# Patient Record
Sex: FEMALE | Race: NATIVE HAWAIIAN OR OTHER PACIFIC ISLANDER | HISPANIC OR LATINO
[De-identification: names, ages, dates, MRNs, and addresses within clinical notes are randomized per-mention and may not be internally consistent; named-entity substitution may affect disease eponyms.]

---

## 2017-04-10 ENCOUNTER — HOSPITAL (OUTPATIENT)
Dept: OTHER | Age: 22
End: 2017-04-10
Attending: OBSTETRICS & GYNECOLOGY

## 2017-04-24 ENCOUNTER — HOSPITAL (OUTPATIENT)
Dept: OTHER | Age: 22
End: 2017-04-24
Attending: OBSTETRICS & GYNECOLOGY

## 2021-05-11 ENCOUNTER — APPOINTMENT (OUTPATIENT)
Dept: URGENT CARE | Age: 26
End: 2021-05-11

## 2022-07-14 ENCOUNTER — HOSPITAL ENCOUNTER (EMERGENCY)
Facility: CLINIC | Age: 27
Discharge: HOME | End: 2022-07-14
Attending: EMERGENCY MEDICINE | Admitting: EMERGENCY MEDICINE
Payer: MEDICAID

## 2022-07-14 VITALS
HEART RATE: 83 BPM | SYSTOLIC BLOOD PRESSURE: 115 MMHG | TEMPERATURE: 98.7 F | OXYGEN SATURATION: 96 % | DIASTOLIC BLOOD PRESSURE: 69 MMHG | RESPIRATION RATE: 18 BRPM | HEIGHT: 62 IN | WEIGHT: 220 LBS | BODY MASS INDEX: 40.48 KG/M2

## 2022-07-14 DIAGNOSIS — S91.312A FOOT LACERATION, LEFT, INITIAL ENCOUNTER: ICD-10-CM

## 2022-07-14 PROCEDURE — 99283 EMERGENCY DEPT VISIT LOW MDM: CPT

## 2022-07-14 PROCEDURE — 250N000009 HC RX 250: Performed by: EMERGENCY MEDICINE

## 2022-07-14 PROCEDURE — 250N000013 HC RX MED GY IP 250 OP 250 PS 637: Performed by: EMERGENCY MEDICINE

## 2022-07-14 RX ORDER — GINSENG 100 MG
CAPSULE ORAL ONCE
Status: COMPLETED | OUTPATIENT
Start: 2022-07-14 | End: 2022-07-14

## 2022-07-14 RX ORDER — CEPHALEXIN 500 MG/1
500 CAPSULE ORAL ONCE
Status: COMPLETED | OUTPATIENT
Start: 2022-07-14 | End: 2022-07-14

## 2022-07-14 RX ORDER — CEPHALEXIN 500 MG/1
500 CAPSULE ORAL 4 TIMES DAILY
Qty: 40 CAPSULE | Refills: 0 | Status: SHIPPED | OUTPATIENT
Start: 2022-07-14 | End: 2022-07-24

## 2022-07-14 RX ADMIN — BACITRACIN: 500 OINTMENT TOPICAL at 21:54

## 2022-07-14 RX ADMIN — CEPHALEXIN 500 MG: 500 CAPSULE ORAL at 21:54

## 2022-07-14 ASSESSMENT — ENCOUNTER SYMPTOMS: WOUND: 1

## 2022-07-15 NOTE — DISCHARGE INSTRUCTIONS
Running water over the laceration is ok but do not soak the wound until the sutures are removed; soaking could prematurely loosen the sutures.    Take the antibiotic pill (Keflex) to help prevent any infection.    Use over-the-counter ointment (like Bacitracin or Neosporin) to the laceration to help prevent infection. Vaseline is also just as good at preventing infection and often much cheaper.    Get the sutures removed in 8-10 days in primary care clinic.    Return for any pus draining, streaking skin redness, or any other concerns.

## 2022-07-15 NOTE — ED TRIAGE NOTES
Patient reports cutting the top of left foot while walking on the water. Last TDAP 5/26/17     Triage Assessment     Row Name 07/14/22 2015       Triage Assessment (Adult)    Airway WDL WDL       Respiratory WDL    Respiratory WDL WDL       Skin Circulation/Temperature WDL    Skin Circulation/Temperature WDL WDL       Cardiac WDL    Cardiac WDL WDL       Peripheral/Neurovascular WDL    Peripheral Neurovascular WDL WDL       Cognitive/Neuro/Behavioral WDL    Cognitive/Neuro/Behavioral WDL WDL

## 2022-07-15 NOTE — ED PROVIDER NOTES
EMERGENCY DEPARTMENT ENCOUNTER      NAME: Aviva Thompson  AGE: 26 year old female  YOB: 1995  MRN: [id number]  EVALUATION DATE & TIME: 7/14/2022  8:39 PM    PCP: No primary care provider on file.    ED PROVIDER: Josue Michel M.D.      Chief Complaint   Patient presents with     Laceration     Left foot          IMPRESSION  1. Foot laceration, left, initial encounter        PLAN  - routine non-absorbable sutured wound cares  - suture removal in 8-10 days in PCP clinic (4 total sutures)  - prophylactic Keflex  - discharge to home    ED COURSE & MEDICAL DECISION MAKING    ED Course as of 07/14/22 2210   Thu Jul 14, 2022   2201 26yoF with tetanus up to date presenting with laceration to dorsum of left foot, lateral aspect. Occurred today while walking in a river; no fall. Has been walking. Exam with 3cm laceration, 4cm lateral to intact DP pulse---CMS intact. No bony tenderness or pain with ROM of joints. Laceration superficial. No concern for neurovascular injury, tendon injury, bony fracture, foreign body. Cleansed and closed without difficulty with 4 sutures; given prophylactic Keflex given water exposure. Dressed with bacitracin & clean dressing. Patient able to tolerate PO and walk without difficulty. Patient comfortable with discharge at this time. Return precautions and need for PCP follow up discussed and understood. No further questions at the time of discharge.       --------------------------------------------------------------------------------   --------------------------------------------------------------------------------     9:23 PM I met with the patient for the initial interview and physical examination. Discussed plan for treatment and workup in the ED.  9:57 PM I performed a laceration on patient. We discussed the plan for discharge and the patient is agreeable. Reviewed supportive cares, symptomatic treatment, outpatient follow up, and reasons to return to the Emergency  "Department. Patient to be discharged by ED RN.       This patient involved a high degree of complexity in medical decision making, as significant risks were present and assessed.    Broad differential considered for this patient presenting, including but not limited to:  Laceration, neurovascular injury, tendon injury, bony injury, foreign body, freshwater exposure    I wore the following PPE during this patient encounter:  N95 mask, face shield w/ eye protection, gloves    MEDICATIONS GIVEN IN THE EMERGENCY DEPARTMENT  Medications   cephALEXin (KEFLEX) capsule 500 mg (500 mg Oral Given 7/14/22 2154)   bacitracin ointment ( Topical Given 7/14/22 2154)       NEW PRESCRIPTIONS STARTED AT TODAY'S ER VISIT  Current Discharge Medication List      START taking these medications    Details   cephALEXin (KEFLEX) 500 MG capsule Take 1 capsule (500 mg) by mouth 4 times daily for 10 days  Qty: 40 capsule, Refills: 0                 =================================================================      HPI  Patient information was obtained from: Patient    Use of : N/A       Aviva Thompson is a 26 year old female with no pertinent history who presents to this ED via walk-in for evaluation of laceration.    Patient reports left foot laceration that happened today around 1800 at [facility]. She fell in the river and a a rock \"slice\" her left foot. Her fiancee was able to assisted her walking. She states the pain hurt more when she put pressure on her left foot. No other complaints at this time.       REVIEW OF SYSTEMS   Review of Systems   Skin: Positive for wound.        Positive for left foot laceration     All other systems reviewed and are negative.    All other systems reviewed and are negative except as noted above in HPI.          --------------- MEDICAL HISTORY ---------------  PAST MEDICAL HISTORY:  History reviewed. No pertinent past medical history.    PAST SURGICAL HISTORY:  History reviewed. No " "pertinent surgical history.    CURRENT MEDICATIONS:    No current facility-administered medications for this encounter.    Current Outpatient Medications:      cephALEXin (KEFLEX) 500 MG capsule, Take 1 capsule (500 mg) by mouth 4 times daily for 10 days, Disp: 40 capsule, Rfl: 0    ALLERGIES:  Allergies   Allergen Reactions     Codeine Anaphylaxis       FAMILY HISTORY:  History reviewed. No pertinent family history.      SOCIAL HISTORY:   Lives in [place], engaged, has a 5yo son     --------------- PHYSICAL EXAM ---------------  Nursing notes and vitals reviewed by me.  VITALS:  Vitals:    07/14/22 2017   BP: (!) 133/92   Pulse: 82   Resp: 16   Temp: 98.7  F (37.1  C)   TempSrc: Oral   SpO2: 97%   Weight: 99.8 kg (220 lb)   Height: 1.575 m (5' 2\")       PHYSICAL EXAM:    General:  alert, interactive, no distress  Eyes:  conjunctivae clear, conjugate gaze  HENT:  atraumatic, nose with no rhinorrhea, oropharynx clear  Neck:  no meningismus  Cardiovascular:  HR 70s during exam, regular rhythm, no murmurs, brisk cap refill  Chest:  no chest wall tenderness  Pulmonary:  no stridor, normal phonation, normal work of breathing, clear lungs bilaterally  Abdomen:  soft, nondistended, nontender  :  no CVA tenderness  Back:  no midline spinal tenderness  Musculoskeletal:  3 cm curvllinear laceration to laternal aspects of mid dorsal foot. No bleeding. 2 +DP, PT. CM's intact. No bony tenderness. Pain ROM joint.  Skin:  warm, dry, no rash  Neuro:  awake, alert, answers questions appropriately, follows commands, moves all limbs  Psych:  calm, normal affect      --------------- RESULTS ---------------  PROCEDURES:   [facility]    -Laceration Repair    Date/Time: 7/14/2022 9:52 PM  Performed by: Josue Michel MD  Authorized by: Josue Michel MD     Risks, benefits and alternatives discussed.      ANESTHESIA (see MAR for exact dosages):     Anesthesia method:  Local infiltration    Local anesthetic:  " Lidocaine 1% WITH epi  LACERATION DETAILS     Location:  Foot    Foot location:  Top of L foot    Length (cm):  4    Depth (mm):  2    REPAIR TYPE:     Repair type:  Simple      EXPLORATION:     Wound exploration: wound explored through full range of motion and entire depth of wound probed and visualized      Wound extent: areolar tissue violated      Wound extent: fascia not violated, no foreign body, no signs of injury, no nerve damage, no tendon damage, no underlying fracture and no vascular damage      Contaminated: no      TREATMENT:     Area cleansed with:  Chata-Clens    Amount of cleaning:  Extensive    Irrigation solution:  Sterile water    Irrigation method:  Syringe    Visualized foreign bodies/material removed: no      SKIN REPAIR     Repair method:  Sutures    Suture size:  4-0    Suture material:  Nylon    Suture technique:  Simple interrupted    Number of sutures:  4    APPROXIMATION     Approximation:  Close    POST-PROCEDURE DETAILS     Dressing:  Antibiotic ointment and non-adherent dressing        PROCEDURE    Patient Tolerance:  Patient tolerated the procedure well with no immediate complications           IKiarra, am serving as a scribe to document services personally performed by Dr. Josue Michel based on my observation and the provider's statements to me. I, Josue Michel MD attest that Kiarra Oliveira is acting in a scribe capacity, has observed my performance of the services and has documented them in accordance with my direction.      Josue Michel MD  07/14/22  Emergency Medicine  [facility] EMERGENCY ROOM  [address] [facility] [id number] [phone]  Dept: [phone]     Josue Michel MD  07/14/22 7565

## 2023-02-07 ENCOUNTER — NURSE TRIAGE (OUTPATIENT)
Dept: NURSING | Facility: CLINIC | Age: 28
End: 2023-02-07
Payer: COMMERCIAL

## 2023-02-07 NOTE — TELEPHONE ENCOUNTER
"Patient calling reporting fatigue, and sore throat.    Reporting shortness of breath starting over 1 week ago, ongoing.    Sore throat pain \"6\" on 0-10 pain scale.    Painful to open mouth, patient is able to open mouth completely during triage.    Temp ranging to 101 Oral today.    Reporting mild shortness of breath. Taking fluids.    Disposition per triage go to office now. Reviewed Walk In Care locations.    Patient stating she is currently working and would go later this afternoon. Reviewed recommendation per guidelines to be seen now.    Caller verbalized understanding. Denies further questions.    Bhargavi Núñez RN  [facility]      Reason for Disposition    Difficulty breathing (per caller) but not severe    Additional Information    Negative: SEVERE difficulty breathing (e.g., struggling for each breath, speaks in single words)    Negative: Sounds like a life-threatening emergency to the triager    Negative: Throat culture results, call about    Negative: Productive cough is main symptom    Negative: Runny nose is main symptom    Negative: Drooling or spitting out saliva (because can't swallow)    Negative: Unable to open mouth completely    Negative: Drinking very little and has signs of dehydration (e.g., no urine > 12 hours, very dry mouth, very lightheaded)    Negative: Patient sounds very sick or weak to the triager    Protocols used: SORE THROAT-A-OH      "

## 2023-02-08 ENCOUNTER — NURSE TRIAGE (OUTPATIENT)
Dept: NURSING | Facility: CLINIC | Age: 28
End: 2023-02-08
Payer: COMMERCIAL

## 2023-02-08 ENCOUNTER — OFFICE VISIT (OUTPATIENT)
Dept: FAMILY MEDICINE | Facility: CLINIC | Age: 28
End: 2023-02-08
Payer: COMMERCIAL

## 2023-02-08 VITALS
OXYGEN SATURATION: 95 % | BODY MASS INDEX: 47.06 KG/M2 | SYSTOLIC BLOOD PRESSURE: 112 MMHG | RESPIRATION RATE: 16 BRPM | DIASTOLIC BLOOD PRESSURE: 77 MMHG | TEMPERATURE: 98 F | WEIGHT: 257.3 LBS | HEART RATE: 107 BPM

## 2023-02-08 DIAGNOSIS — Z20.818 STREPTOCOCCUS EXPOSURE: Primary | ICD-10-CM

## 2023-02-08 LAB
DEPRECATED S PYO AG THROAT QL EIA: NEGATIVE
GROUP A STREP BY PCR: NOT DETECTED

## 2023-02-08 PROCEDURE — 99203 OFFICE O/P NEW LOW 30 MIN: CPT | Performed by: PHYSICIAN ASSISTANT

## 2023-02-08 PROCEDURE — 87651 STREP A DNA AMP PROBE: CPT | Performed by: PHYSICIAN ASSISTANT

## 2023-02-08 NOTE — PATIENT INSTRUCTIONS
Suggested increased rest increased fluids and bedside humidification  Over-the-counter Tylenol for comfort.  Follow packaging directions  Over-the-counter throat lozenges with benzocaine such as Cepacol may be used if indicated and is not a choking hazard based on age.  Follow packaging directions.  Do not overuse the benzocaine as it will dry the throat and make it uncomfortable.  Follow up with primary care provider if you do not get resolution with the course of treatment.  Return to walk-in care if complication or new symptoms arise in the interim.            Self-Care for Sore Throats  Sore throats happen for many reasons, such as colds, allergies, and infections caused by viruses or bacteria. In any case, your throat becomes red and sore. Your goal for self-care is to reduce your discomfort while giving your throat a chance to heal.    Moisten and soothe your throat  Tips include the following:  Try a sip of water first thing after waking up.  Keep your throat moist by drinking 6 or more glasses of clear liquids every day.  Run a cool-air humidifier in your room overnight.  Avoid cigarette smoke.   Suck on throat lozenges, cough drops, hard candy, ice chips, or frozen fruit-juice bars. Use the sugar-free versions if your diet or medical condition requires them.  Gargle to ease irritation  Gargling every hour or 2 can ease irritation. Try gargling with 1 of these solutions:  1/4 teaspoon of salt in 1/2 cup of warm water  An over-the-counter anesthetic gargle  Use medicine for more relief  Over-the-counter medicine can reduce sore throat symptoms. Ask your pharmacist if you have questions about which medicine to use:  Ease pain with anesthetic sprays. Aspirin or an aspirin substitute also helps. Remember, never give aspirin to anyone 18 or younger, or if you are already taking blood thinners.   For sore throats caused by allergies, try antihistamines to block the allergic reaction.  Remember: unless a sore  throat is caused by a bacterial infection, antibiotics won t help you.  Prevent future sore throats  Prevention tips include the following:  Stop smoking or reduce contact with secondhand smoke. Smoke irritates the tender throat lining.  Limit contact with pets and with allergy-causing substances, such as pollen and mold.  When you re around someone with a sore throat or cold, wash your hands often to keep viruses or bacteria from spreading.  Don t strain your vocal cords.  Call your healthcare provider  Contact your healthcare provider if you have:  A temperature over 101 F (38.3 C)  White spots on the throat  Great difficulty swallowing  Trouble breathing  A skin rash  Recent exposure to someone else with strep bacteria  Severe hoarseness and swollen glands in the neck or jaw   Date Last Reviewed: 8/1/2016 2000-2016 [employer]. [address] [place] [address], [place] [address]. All rights reserved. This information is not intended as a substitute for professional medical care. Always follow your healthcare professional's instructions.

## 2023-02-08 NOTE — PROGRESS NOTES
Patient presents with:  Throat Pain: X yesterday. Cough. Pain when swallow. Swollen glands.  Otalgia: Rt ear pressure x yesterday.       Clinical Decision Making:  Strep test was obtained and was negative.  Culture is to follow.  Symptomatic care was gone over. Expected course of resolution and indication for return was gone over and questions were answered to patient/parent's satisfaction before discharge.        ICD-10-CM    1. Streptococcus exposure  Z20.818 Streptococcus A Rapid Screen w/Reflex to PCR - Clinic Collect     Group A Streptococcus PCR Throat Swab          Patient Instructions     Suggested increased rest increased fluids and bedside humidification  Over-the-counter Tylenol for comfort.  Follow packaging directions  Over-the-counter throat lozenges with benzocaine such as Cepacol may be used if indicated and is not a choking hazard based on age.  Follow packaging directions.  Do not overuse the benzocaine as it will dry the throat and make it uncomfortable.  Follow up with primary care provider if you do not get resolution with the course of treatment.  Return to walk-in care if complication or new symptoms arise in the interim.            Self-Care for Sore Throats  Sore throats happen for many reasons, such as colds, allergies, and infections caused by viruses or bacteria. In any case, your throat becomes red and sore. Your goal for self-care is to reduce your discomfort while giving your throat a chance to heal.    Moisten and soothe your throat  Tips include the following:    Try a sip of water first thing after waking up.    Keep your throat moist by drinking 6 or more glasses of clear liquids every day.    Run a cool-air humidifier in your room overnight.    Avoid cigarette smoke.     Suck on throat lozenges, cough drops, hard candy, ice chips, or frozen fruit-juice bars. Use the sugar-free versions if your diet or medical condition requires them.  Gargle to ease irritation  Gargling every hour  or 2 can ease irritation. Try gargling with 1 of these solutions:    1/4 teaspoon of salt in 1/2 cup of warm water    An over-the-counter anesthetic gargle  Use medicine for more relief  Over-the-counter medicine can reduce sore throat symptoms. Ask your pharmacist if you have questions about which medicine to use:    Ease pain with anesthetic sprays. Aspirin or an aspirin substitute also helps. Remember, never give aspirin to anyone 18 or younger, or if you are already taking blood thinners.     For sore throats caused by allergies, try antihistamines to block the allergic reaction.    Remember: unless a sore throat is caused by a bacterial infection, antibiotics won t help you.  Prevent future sore throats  Prevention tips include the following:    Stop smoking or reduce contact with secondhand smoke. Smoke irritates the tender throat lining.    Limit contact with pets and with allergy-causing substances, such as pollen and mold.    When you re around someone with a sore throat or cold, wash your hands often to keep viruses or bacteria from spreading.    Don t strain your vocal cords.  Call your healthcare provider  Contact your healthcare provider if you have:    A temperature over 101 F (38.3 C)    White spots on the throat    Great difficulty swallowing    Trouble breathing    A skin rash    Recent exposure to someone else with strep bacteria    Severe hoarseness and swollen glands in the neck or jaw   Date Last Reviewed: 8/1/2016 2000-2016 [employer]. [address] [place] [address]. All rights reserved. This information is not intended as a substitute for professional medical care. Always follow your healthcare professional's instructions.        HPI:  Aviva Thompson is a 27 year old female who presents today for sore throat and odynophagia one day acute onset of sore throat and odynophagia.  Patient denies fever, chills, night sweats, fatigue, vomiting, diarrhea, skin rash,  abdominal pain or urinary symptoms.      No known sick contacts for strep throat.    Has not tried treatment for this over-the-counter.    Covid negative at home yesterday.    History obtained from chart review and the patient.    Problem List:  There are no relevant problems documented for this patient.      No past medical history on file.    Social History     Tobacco Use     Smoking status: Never     Smokeless tobacco: Never   Substance Use Topics     Alcohol use: Not on file       Review of Systems  As above in HPI otherwise negative.    Vitals:    02/08/23 1005   BP: 112/77   BP Location: Right arm   Patient Position: Sitting   Cuff Size: Adult Large   Pulse: 107   Resp: 16   Temp: 98  F (36.7  C)   TempSrc: Oral   SpO2: 95%   Weight: 116.7 kg (257 lb 4.8 oz)       General: Patient is resting comfortably no acute distress is afebrile  HEENT: Head is normocephalic atraumatic   eyes are PERRL EOMI sclera anicteric   TMs are clear bilaterally  Throat is with mild pharyngeal wall erythema and no exudate  No cervical lymphadenopathy present  LUNGS: Clear to auscultation bilaterally  HEART: Regular rate and rhythm  Skin: Without rash non-diaphoretic    Physical Exam      Labs:  Results for orders placed or performed in visit on 02/08/23   Streptococcus A Rapid Screen w/Reflex to PCR - Clinic Collect     Status: Normal    Specimen: Throat; Swab   Result Value Ref Range    Group A Strep antigen Negative Negative   Group A Streptococcus PCR Throat Swab     Status: Normal    Specimen: Throat; Swab                        At the end of the encounter, I discussed results, diagnosis, medications. Discussed red flags for immediate return to clinic/ER, as well as indications for follow up if no improvement. Patient understood and agreed to plan. Patient was stable for discharge.

## 2023-02-08 NOTE — TELEPHONE ENCOUNTER
Having swelling, nasal swelling, fever of 102, sore throat. On going two days. She doesn't have a PCP so will go to urgent care. I encouraged her to set up an intake appointment to get established with a PCP. She understands.  Kristina Aviles RN  [facility]      Reason for Disposition    Unable to open mouth completely    Additional Information    Negative: SEVERE difficulty breathing (e.g., struggling for each breath, speaks in single words)    Negative: Sounds like a life-threatening emergency to the triager    Negative: Throat culture results, call about    Negative: Productive cough is main symptom    Negative: Runny nose is main symptom    Negative: Drooling or spitting out saliva (because can't swallow)    Protocols used: SORE THROAT-A-OH

## 2023-02-08 NOTE — LETTER
[address] [address]  Phone: [phone]  Fax: [phone]    February 8, 2023        Aviva Thompson  [address] [facility] [address]          To whom it may concern:    RE: Aviva Thompson    Patient was seen and treated today at our clinic and missed work.    Please contact me for questions or concerns.      Sincerely,        Richard Deluca PA-C

## 2023-02-12 ENCOUNTER — HEALTH MAINTENANCE LETTER (OUTPATIENT)
Age: 28
End: 2023-02-12

## 2023-08-13 ENCOUNTER — HEALTH MAINTENANCE LETTER (OUTPATIENT)
Age: 28
End: 2023-08-13

## 2024-01-11 ENCOUNTER — OFFICE VISIT (OUTPATIENT)
Dept: FAMILY MEDICINE | Facility: CLINIC | Age: 29
End: 2024-01-11
Payer: COMMERCIAL

## 2024-01-11 VITALS
DIASTOLIC BLOOD PRESSURE: 83 MMHG | RESPIRATION RATE: 18 BRPM | HEART RATE: 86 BPM | OXYGEN SATURATION: 97 % | WEIGHT: 260 LBS | SYSTOLIC BLOOD PRESSURE: 135 MMHG | BODY MASS INDEX: 47.55 KG/M2 | TEMPERATURE: 99.2 F

## 2024-01-11 DIAGNOSIS — N76.0 VAGINITIS AND VULVOVAGINITIS: Primary | ICD-10-CM

## 2024-01-11 DIAGNOSIS — N76.6 ULCERATION, VULVA: ICD-10-CM

## 2024-01-11 PROBLEM — D47.02 SYSTEMIC MASTOCYTOSIS: Status: ACTIVE | Noted: 2023-09-01

## 2024-01-11 PROBLEM — Z87.19 HISTORY OF RECTAL BLEEDING: Status: ACTIVE | Noted: 2020-04-15

## 2024-01-11 PROBLEM — L40.0 PLAQUE PSORIASIS: Status: ACTIVE | Noted: 2023-02-20

## 2024-01-11 PROBLEM — D47.01 CUTANEOUS MASTOCYTOSIS: Status: ACTIVE | Noted: 2023-03-07

## 2024-01-11 LAB
C TRACH DNA SPEC QL NAA+PROBE: NEGATIVE
CLUE CELLS: NORMAL
N GONORRHOEA DNA SPEC QL NAA+PROBE: NEGATIVE
TRICHOMONAS, WET PREP: NORMAL
WBC'S/HIGH POWER FIELD, WET PREP: NORMAL
YEAST, WET PREP: NORMAL

## 2024-01-11 PROCEDURE — 87591 N.GONORRHOEAE DNA AMP PROB: CPT | Performed by: PHYSICIAN ASSISTANT

## 2024-01-11 PROCEDURE — 87491 CHLMYD TRACH DNA AMP PROBE: CPT | Performed by: PHYSICIAN ASSISTANT

## 2024-01-11 PROCEDURE — 99214 OFFICE O/P EST MOD 30 MIN: CPT | Performed by: PHYSICIAN ASSISTANT

## 2024-01-11 PROCEDURE — 87210 SMEAR WET MOUNT SALINE/INK: CPT | Performed by: PHYSICIAN ASSISTANT

## 2024-01-11 PROCEDURE — 87529 HSV DNA AMP PROBE: CPT | Performed by: PHYSICIAN ASSISTANT

## 2024-01-11 RX ORDER — VALACYCLOVIR HYDROCHLORIDE 1 G/1
1000 TABLET, FILM COATED ORAL 2 TIMES DAILY
Qty: 20 TABLET | Refills: 0 | Status: SHIPPED | OUTPATIENT
Start: 2024-01-11 | End: 2024-01-21

## 2024-01-11 RX ORDER — LIDOCAINE 40 MG/G
CREAM TOPICAL 2 TIMES DAILY
Qty: 30 G | Refills: 0 | Status: SHIPPED | OUTPATIENT
Start: 2024-01-11

## 2024-01-11 NOTE — PROGRESS NOTES
Assessment & Plan     Vaginitis and vulvovaginitis    - Wet prep - Clinic Collect  - NEISSERIA GONORRHOEA PCR  - CHLAMYDIA TRACHOMATIS PCR  - HSV Types 1 and 2 Qualitative PCR CSF  - HSV Types 1 and 2 Qualitative PCR CSF  - lidocaine (LMX4) 4 % external cream  Dispense: 30 g; Refill: 0  - valACYclovir (VALTREX) 1000 mg tablet  Dispense: 20 tablet; Refill: 0    Ulceration, vulva    - valACYclovir (VALTREX) 1000 mg tablet  Dispense: 20 tablet; Refill: 0     Was seen for multiple vulvar erosions and ulcerations.  Her exam is suspicious for HSV.  HSV PCR was obtained and pending.  Will cover with valtrex awaiting results.  She was given lidocaine topical for symptomatic improvement.  Discussed warm soaks in the bathtub and barrier cream for symptomatic improvement as well.  Wet prep was negative.  GC chlamydia is pending.  Be contacted with results of HSV as they return.  PI given and discussed.  At the end of the encounter, I discussed results, diagnosis, medications. Discussed red flags for immediate return to clinic/ER, as well as indications for follow up if no improvement. Patient understood and agreed to plan.      30 min spent in evaluation, management, ordering and interpreting tests and documentation.    Preethi Dow PA-C  [facility]    Shirley Chicas is a 28 year old female who presents to clinic today for the following health issues:  Chief Complaint   Patient presents with    Rash     Vaginal rash painful for 3 day. Pain when walking and urination.     HPI  Pt reports a 3 day hx of vulvar pain, irritation, discomfort with urination when the urine hits the skin.  No increased discharge or odor.  No new sexual partner. Partner does not have hx of hsv but reports his previous sexual partner did.    No abdomen pain or back pain. No fevers.      Review of Systems  Constitutional, HEENT, cardiovascular, pulmonary, gi and gu systems are negative, except as otherwise noted.       Objective    /83   Pulse 86   Temp 99.2  F (37.3  C) (Oral)   Resp 18   Wt 117.9 kg (260 lb)   SpO2 97%   BMI 47.55 kg/m    Physical Exam   External genitalia is that of a normal young female.  There are multiple shallow painful ulcerations of the labia majora and minora as well as extending to the medial thigh.  No significant vaginal discharge is noted on speculum exam.  Cervix is nulliparous and closed.  No mucopurulent discharge present.. no CMT .     Results for orders placed or performed in visit on 01/11/24   Wet prep - Clinic Collect     Status: Normal    Specimen: Vagina; Swab   Result Value Ref Range    Trichomonas Absent Absent    Yeast Absent Absent    Clue Cells Absent Absent    WBCs/high power field None None

## 2024-01-12 LAB
HSV1 DNA SPEC QL NAA+PROBE: DETECTED
HSV2 DNA SPEC QL NAA+PROBE: NOT DETECTED

## 2024-10-06 ENCOUNTER — HEALTH MAINTENANCE LETTER (OUTPATIENT)
Age: 29
End: 2024-10-06